# Patient Record
Sex: MALE | Race: WHITE | Employment: FULL TIME | URBAN - NONMETROPOLITAN AREA
[De-identification: names, ages, dates, MRNs, and addresses within clinical notes are randomized per-mention and may not be internally consistent; named-entity substitution may affect disease eponyms.]

---

## 2017-09-06 ENCOUNTER — HOSPITAL ENCOUNTER (EMERGENCY)
Facility: HOSPITAL | Age: 32
Discharge: HOME | End: 2017-09-06
Attending: NURSE PRACTITIONER | Admitting: NURSE PRACTITIONER
Payer: COMMERCIAL

## 2017-09-06 VITALS
SYSTOLIC BLOOD PRESSURE: 141 MMHG | TEMPERATURE: 98 F | BODY MASS INDEX: 23.01 KG/M2 | RESPIRATION RATE: 16 BRPM | OXYGEN SATURATION: 100 % | DIASTOLIC BLOOD PRESSURE: 79 MMHG | HEART RATE: 74 BPM | WEIGHT: 165 LBS

## 2017-09-06 DIAGNOSIS — K04.7 DENTAL ABSCESS: ICD-10-CM

## 2017-09-06 PROCEDURE — 10060 I&D ABSCESS SIMPLE/SINGLE: CPT | Performed by: NURSE PRACTITIONER

## 2017-09-06 PROCEDURE — 10060 I&D ABSCESS SIMPLE/SINGLE: CPT

## 2017-09-06 PROCEDURE — 40000268 ZZH STATISTIC NO CHARGES

## 2017-09-06 RX ORDER — PENICILLIN V POTASSIUM 500 MG/1
500 TABLET, FILM COATED ORAL 3 TIMES DAILY
Qty: 30 TABLET | Refills: 0 | Status: SHIPPED | OUTPATIENT
Start: 2017-09-06 | End: 2017-09-16

## 2017-09-06 ASSESSMENT — ENCOUNTER SYMPTOMS
CHILLS: 0
FEVER: 0
FATIGUE: 0
APPETITE CHANGE: 0

## 2017-09-06 NOTE — ED AVS SNAPSHOT
[facility]    [address] [address]    Phone:  [phone]                                       Wu Ochoa   MRN: [id number]    Department:  [facility]   Date of Visit:  9/6/2017           Patient Information     Date Of Birth          1985        Your diagnoses for this visit were:     Dental abscess left lower posterior jaw, incised and drained       You were seen by Kelley Rai NP.      Follow-up Information     Follow up with [facility].    Specialty:  EMERGENCY MEDICINE    Why:  As needed, If symptoms worsen    Contact information:    [address]  [place] [id number] [phone]    Additional information:    From [place]: Take [address]. Turn left at [address]. Turn left at the first stoplight on [address]. At the first stop sign, take a right onto [address]. Take a left into the parking lot and continue through until you reach the North enterance of the building.       From [place]: Take [address]. Take the [address] ramp towards [place]. Turn left onto [address]. Take a slight right onto [address]. Turn left at the first stoplight on [address]. At the first stop sign, take a right onto [address]. Take a left into the parking lot and continue through until you reach the North enterance of the building.       From [place]: Take [address]. Take a right at [address]. At the first stop sign, take a right onto [address]. Take a left into the parking lot and continue through until you reach the North enterance of the building.         Follow up with Dentist. Call on 9/7/2017.    Why:  to schedule an appointment        Discharge Instructions         Dental Abscess  An abscess is a sac of pus. A dental abscess forms when a tooth or the tissue around it becomes infected with bacteria. The bacteria can enter through  a cavity or a crack in a tooth. It can also infect the gum tissue or bone around a tooth. An untreated abscess can cause the loss of the tooth. It can even spread to other parts of the body and become life-threatening.    Symptoms of a dental abscess   Signs of a dental abscess include:    Toothache, often severe    Tooth pain with hot, cold, or pressure    Pain in the gums, cheek, or jaw    Bad breath or bitter taste in the mouth    Trouble swallowing or opening the mouth    Fever    Swollen or enlarged glands in the neck  Diagnosing a dental abscess  An abscess is diagnosed by looking at your teeth and gums. You will be told if any tests, like dental X-rays, are needed.  Treating a dental abscess  Treatments for a dental abscess may include the following:    Antibiotic medicines to treat the underlying infection.    Pain relievers to help you feel more comfortable. Your health care provider may prescribe a medicine for you. Or, use over-the-counter pain relievers, like acetaminophen or ibuprofen.    Warm saltwater rinses to soothe discomfort and help clear away pus.    Root canal surgery if needed to save the tooth. With a root canal, the infected part of the tooth is removed. A special substance is then used to fill the empty space in the tooth.    Drainage of the abscess if needed. Incisions are made to allow the infected material to drain from the tooth.    Removal of the tooth in cases of severe infection that can t be treated another way.  If the infection is severe, has spread, or doesn t respond to treatment, you may need to be admitted to a hospital.        When to call the dentist  Call your dentist right away if you have any of the following:    Fever of 100.4 F (38 C) or higher    Increased pain, redness, drainage, or swelling in the treated area    Swelling of the face or jawbone    Pain that cannot be controlled with medicines   Preventing dental abscess  To prevent another abscess in the future, keep  "your teeth clean and healthy. Brush twice a day and floss at least once daily. See your dentist for regular tooth cleanings. And avoid sugary foods and drinks that can lead to tooth decay.  Date Last Reviewed: 7/14/2015 2000-2017 [employer]. [address] [place] [address]. All rights reserved. This information is not intended as a substitute for professional medical care. Always follow your healthcare professional's instructions.             Review of your medicines      START taking        Dose / Directions Last dose taken    penicillin V potassium 500 MG tablet   Commonly known as:  VEETID   Dose:  500 mg   Quantity:  30 tablet        Take 1 tablet (500 mg) by mouth 3 times daily for 10 days   Refills:  0                Prescriptions were sent or printed at these locations (1 Prescription)                   [facility] [address] [place] [address], [place] - [address] AT [facility] [address]   [address], [place] [address]    Telephone:  [phone]   Fax:  [phone]   Hours:                  E-Prescribed (1 of 1)         penicillin V potassium (VEETID) 500 MG tablet                Orders Needing Specimen Collection     None      Pending Results     No orders found from 9/4/2017 to 9/7/2017.            Pending Culture Results     No orders found from 9/4/2017 to 9/7/2017.            Thank you for choosing [facility]       Thank you for choosing [facility] for your care. Our goal is always to provide you with excellent care. Hearing back from our patients is one way we can continue to improve our services. Please take a few minutes to complete the written survey that you may receive in the mail after you visit with us. Thank you!        [facility] [employer] Information     [employer] lets you send messages to your doctor, view your test results, renew your prescriptions, schedule appointments and more. To sign up, go to [email or web address] . Click on \"Log in\" on the left side of the screen, " "which will take you to the Welcome page. Then click on \"Sign up Now\" on the right side of the page.     You will be asked to enter the access code listed below, as well as some personal information. Please follow the directions to create your username and password.     Your access code is: [email or web address]  Expires: 2017  9:21 PM     Your access code will  in 90 days. If you need help or a new code, please call your [facility] clinic or [phone].        Care EveryWhere ID     This is your Care EveryWhere ID. This could be used by other organizations to access your [facility] medical records  [id number]        Equal Access to Services     CHANDA CAMEJO : Maribel Menchaca, maria teresa winters, emeka will, marcos valdes. So [facility] [phone].    ATENCIÓN: Si habla español, tiene a gutierrez disposición servicios gratuitos de asistencia lingüística. Llame al [phone].    We comply with applicable federal civil rights laws and [place] laws. We do not discriminate on the basis of race, color, national origin, age, disability sex, sexual orientation or gender identity.            After Visit Summary       This is your record. Keep this with you and show to your community pharmacist(s) and doctor(s) at your next visit.                  "

## 2017-09-06 NOTE — ED AVS SNAPSHOT
[facility]    [address] [address]    Phone:  [phone]                                       Wu Ochoa   MRN: [id number]    Department:  [facility]   Date of Visit:  9/6/2017           After Visit Summary Signature Page     I have received my discharge instructions, and my questions have been answered. I have discussed any challenges I see with this plan with the nurse or doctor.    ..........................................................................................................................................  Patient/Patient Representative Signature      ..........................................................................................................................................  Patient Representative Print Name and Relationship to Patient    ..................................................               ................................................  Date                                            Time    ..........................................................................................................................................  Reviewed by Signature/Title    ...................................................              ..............................................  Date                                                            Time

## 2017-09-07 NOTE — ED NOTES
Pt presents today with his wife for c/o dental pain for 2 days now and feels he has an abscess, affected area is the left bottom molar.

## 2017-09-07 NOTE — ED PROVIDER NOTES
"  History     Chief Complaint   Patient presents with     Dental Pain     \"left bottom tooth causing left jaw pain for 2 days\"      The history is provided by the patient. No  was used.     Wu Ochoa is a 32 year old male who presents today with a CC of left lower dental pain x 2 days.  No fevers or chills.  He notes there is a lump on his left lower gumline.  He sees dr Seaman in [place] for dental services.  Last visit was last year.  He has not taken anything for pain.  Pain level 1-2/10.      I have reviewed the Medications, Allergies, Past Medical and Surgical History, and Social History in the Epic system.    Allergies: No Known Allergies      No current facility-administered medications on file prior to encounter.   No current outpatient prescriptions on file prior to encounter.    Patient Active Problem List   Diagnosis     ACP (advance care planning)       History reviewed. No pertinent surgical history.    Social History   Substance Use Topics     Smoking status: Current Every Day Smoker     Packs/day: 1.00     Years: 15.00     Types: Cigarettes     Start date: 12/5/1999     Smokeless tobacco: Not on file     Alcohol use Yes         There is no immunization history on file for this patient.    BMI: Estimated body mass index is 23.01 kg/(m^2) as calculated from the following:    Height as of 12/5/16: 1.803 m (5' 11\").    Weight as of this encounter: 74.8 kg (165 lb).      Review of Systems   Constitutional: Negative for appetite change, chills, fatigue and fever.   HENT: Positive for dental problem.        Physical Exam   BP: 141/79  Pulse: 74  Temp: 98  F (36.7  C)  Resp: 16  Weight: 74.8 kg (165 lb)  SpO2: 100 %    Physical Exam   Constitutional: He appears well-developed and well-nourished. He is cooperative. He does not appear ill. No distress.   HENT:   Head: Normocephalic and atraumatic.   Mouth/Throat: Uvula is midline. No trismus in the jaw. Dental abscesses (left lower " "posterior alveolar process) present. No uvula swelling.   Neurological: He is alert.   Nursing note and vitals reviewed.      ED Course     ED Course     Incision + drainage  Date/Time: 9/8/2017 8:36 PM  Performed by: OVIDIO LYNCH  Authorized by: OVIDIO LYNCH   Consent: Verbal consent obtained. Written consent obtained.  Risks and benefits: risks, benefits and alternatives were discussed  Consent given by: patient  Patient understanding: patient states understanding of the procedure being performed  Patient consent: the patient's understanding of the procedure matches consent given  Procedure consent: procedure consent matches procedure scheduled  Required items: required blood products, implants, devices, and special equipment available  Patient identity confirmed: verbally with patient and arm band  Time out: Immediately prior to procedure a \"time out\" was called to verify the correct patient, procedure, equipment, support staff and site/side marked as required.  Type: abscess  Body area: mouth  Location details: alveolar process  Anesthesia: local infiltration    Anesthesia:  Local Anesthetic: bupivacaine 0.5% with epinephrine  Anesthetic total: 3 mL    Sedation:  Patient sedated: no  Scalpel size: 11  Incision type: single straight  Incision depth: dermal  Complexity: simple  Drainage: purulent  Drainage amount: moderate  Wound treatment: wound left open  Packing material: none  Patient tolerance: Patient tolerated the procedure well with no immediate complications        Assessments & Plan (with Medical Decision Making)     I have reviewed the nursing notes.    I have reviewed the findings, diagnosis, plan and need for follow up with the patient.  ASSESSMENT / PLAN:  (K04.7) Dental abscess  Comment: left lower posterior jaw, incised and drained  Plan:  Pen VK as prescribed for infection.   Apply a cold pack or ice compress for up to 20 minutes several times a day. This is to help reduce pain and " "relieve swelling. Cover it with a thin, dry cloth before putting it on your skin.     Rinse your mouth with warm saltwater several times per day. This will help reduce irritation, gum swelling, and pain.  Good oral hygiene is imperitive. Brush your at least twice a day. Use a fluoride toothpaste and soft-bristle toothbrush.   Eat a healthy diet that doesn t include many sugary foods and drinks.   Call ASAP to schedule an appointment to see a dentist.   Our  department can try to assist you if you are having difficulty finding a dentist, dental coverage, or paying for dental care.  You can reach them by calling [phone] and asking for .   Return to ED/UC if symptoms increase or concerns develop such as those discussed and listed on the \"When to go the Emergency Room\" portion of your discharge instructions.       Discharge Medication List as of 9/6/2017  9:21 PM      START taking these medications    Details   penicillin V potassium (VEETID) 500 MG tablet Take 1 tablet (500 mg) by mouth 3 times daily for 10 days, Disp-30 tablet, R-0, E-Prescribe             Final diagnoses:   Dental abscess - left lower posterior jaw, incised and drained       9/6/2017   [facility]     Kelley Rai NP  09/08/17 2040    "

## 2017-09-07 NOTE — DISCHARGE INSTRUCTIONS
Dental Abscess  An abscess is a sac of pus. A dental abscess forms when a tooth or the tissue around it becomes infected with bacteria. The bacteria can enter through a cavity or a crack in a tooth. It can also infect the gum tissue or bone around a tooth. An untreated abscess can cause the loss of the tooth. It can even spread to other parts of the body and become life-threatening.    Symptoms of a dental abscess   Signs of a dental abscess include:    Toothache, often severe    Tooth pain with hot, cold, or pressure    Pain in the gums, cheek, or jaw    Bad breath or bitter taste in the mouth    Trouble swallowing or opening the mouth    Fever    Swollen or enlarged glands in the neck  Diagnosing a dental abscess  An abscess is diagnosed by looking at your teeth and gums. You will be told if any tests, like dental X-rays, are needed.  Treating a dental abscess  Treatments for a dental abscess may include the following:    Antibiotic medicines to treat the underlying infection.    Pain relievers to help you feel more comfortable. Your health care provider may prescribe a medicine for you. Or, use over-the-counter pain relievers, like acetaminophen or ibuprofen.    Warm saltwater rinses to soothe discomfort and help clear away pus.    Root canal surgery if needed to save the tooth. With a root canal, the infected part of the tooth is removed. A special substance is then used to fill the empty space in the tooth.    Drainage of the abscess if needed. Incisions are made to allow the infected material to drain from the tooth.    Removal of the tooth in cases of severe infection that can t be treated another way.  If the infection is severe, has spread, or doesn t respond to treatment, you may need to be admitted to a hospital.        When to call the dentist  Call your dentist right away if you have any of the following:    Fever of 100.4 F (38 C) or higher    Increased pain, redness, drainage, or swelling in the  treated area    Swelling of the face or jawbone    Pain that cannot be controlled with medicines   Preventing dental abscess  To prevent another abscess in the future, keep your teeth clean and healthy. Brush twice a day and floss at least once daily. See your dentist for regular tooth cleanings. And avoid sugary foods and drinks that can lead to tooth decay.  Date Last Reviewed: 7/14/2015 2000-2017 The [employer] (NIH). [address] [place] [address], [place] [address]. All rights reserved. This information is not intended as a substitute for professional medical care. Always follow your healthcare professional's instructions.

## 2018-12-18 ENCOUNTER — HOSPITAL ENCOUNTER (EMERGENCY)
Facility: HOSPITAL | Age: 33
Discharge: HOME | End: 2018-12-18
Attending: PHYSICIAN ASSISTANT | Admitting: PHYSICIAN ASSISTANT
Payer: COMMERCIAL

## 2018-12-18 VITALS
RESPIRATION RATE: 17 BRPM | SYSTOLIC BLOOD PRESSURE: 137 MMHG | HEART RATE: 79 BPM | DIASTOLIC BLOOD PRESSURE: 78 MMHG | TEMPERATURE: 98.7 F | OXYGEN SATURATION: 98 %

## 2018-12-18 DIAGNOSIS — H61.22 IMPACTED CERUMEN OF LEFT EAR: ICD-10-CM

## 2018-12-18 DIAGNOSIS — H60.92 OTITIS EXTERNA, LEFT: ICD-10-CM

## 2018-12-18 PROCEDURE — G0463 HOSPITAL OUTPT CLINIC VISIT: HCPCS

## 2018-12-18 PROCEDURE — 99213 OFFICE O/P EST LOW 20 MIN: CPT | Performed by: PHYSICIAN ASSISTANT

## 2018-12-18 RX ORDER — CIPROFLOXACIN 0.5 MG/.25ML
0.25 SOLUTION/ DROPS AURICULAR (OTIC) 2 TIMES DAILY
Qty: 3.5 ML | Refills: 0 | Status: SHIPPED | OUTPATIENT
Start: 2018-12-18 | End: 2018-12-25

## 2018-12-18 ASSESSMENT — ENCOUNTER SYMPTOMS
PSYCHIATRIC NEGATIVE: 1
EYE REDNESS: 0
NECK PAIN: 0
VOMITING: 0
ABDOMINAL PAIN: 0
COUGH: 0
DIZZINESS: 0
NAUSEA: 0
TROUBLE SWALLOWING: 0
FEVER: 0
CARDIOVASCULAR NEGATIVE: 1
HEADACHES: 0
EYE DISCHARGE: 0
LIGHT-HEADEDNESS: 0
VOICE CHANGE: 0
DIARRHEA: 0
SINUS PRESSURE: 0
NECK STIFFNESS: 0
APPETITE CHANGE: 0
SORE THROAT: 0
FATIGUE: 0

## 2018-12-18 NOTE — ED AVS SNAPSHOT
[facility]  [address] [address]  Phone:  [phone]                                    Wu Ochoa   MRN: [id number]    Department[facility]  [facility]   Date of Visit:  12/18/2018           After Visit Summary Signature Page    I have received my discharge instructions, and my questions have been answered. I have discussed any challenges I see with this plan with the nurse or doctor.    ..........................................................................................................................................  Patient/Patient Representative Signature      ..........................................................................................................................................  Patient Representative Print Name and Relationship to Patient    ..................................................               ................................................  Date                                   Time    ..........................................................................................................................................  Reviewed by Signature/Title    ...................................................              ..............................................  Date                                               Time          22EPIC Rev 08/18

## 2018-12-18 NOTE — ED PROVIDER NOTES
History     Chief Complaint   Patient presents with     Ear Fullness     states can't hear out of left ear. Reports it started this morning. States does hurt a little and rates 1/10     The history is provided by the patient. No  was used.     Wu Ochoa is a 33 year old male who has one day of left ear hearing loss. Denies ear pain. Denies any trauma to the ear. No n/v/d/f/c. No cough/congestion. No sinus pain/pressure    Problem List:    Patient Active Problem List    Diagnosis Date Noted     ACP (advance care planning) 12/05/2016     Priority: Medium     Advance Care Planning 12/5/2016: ACP Review of Chart / Resources Provided:  Reviewed chart for advance care plan.  Wu Ochoa has no plan or code status on file. Discussed available resources and provided with information. Confirmed code status reflects current choices pending further ACP discussions.  Confirmed/documented legally designated decision makers.  Added by Azul Fan                  PMH: not pertinent  PSH: not pertinent  FHX: not pertinent    Social History:  Marital Status:   [2]  Social History     Tobacco Use     Smoking status: Current Every Day Smoker     Packs/day: 1.00     Years: 15.00     Pack years: 15.00     Types: Cigarettes     Start date: 12/5/1999   Substance Use Topics     Alcohol use: Yes     Drug use: No        Medications:      ciprofloxacin (CETRAXAL) 0.2 % otic solution         Review of Systems   Constitutional: Negative for appetite change, fatigue and fever.   HENT: Positive for hearing loss. Negative for congestion, ear pain, sinus pressure, sore throat, trouble swallowing and voice change.    Eyes: Negative for discharge and redness.   Respiratory: Negative for cough.    Cardiovascular: Negative.    Gastrointestinal: Negative for abdominal pain, diarrhea, nausea and vomiting.   Genitourinary: Negative.    Musculoskeletal: Negative for neck pain and neck stiffness.   Skin:  Negative for rash.   Neurological: Negative for dizziness, light-headedness and headaches.   Psychiatric/Behavioral: Negative.        Physical Exam   BP: 137/78  Pulse: 79  Temp: 98.7  F (37.1  C)  Resp: 17  SpO2: 98 %      Physical Exam   Constitutional: He is oriented to person, place, and time. He appears well-developed and well-nourished. No distress.   HENT:   Head: Normocephalic and atraumatic.   Right Ear: External ear normal.   Mouth/Throat: Oropharynx is clear and moist.   Bilateral canals clear/wnl  No sinus TTP    Right TM/canal clear/wnl.  Left canal has cerumen impaction  Irrigated and cerumen removed completely. Ear canal has mild erythema, minimal edema. TM intact and gray.   Eyes: Conjunctivae and EOM are normal. Right eye exhibits no discharge. Left eye exhibits no discharge.   Neck: Normal range of motion. Neck supple.   Cardiovascular: Normal rate, regular rhythm and normal heart sounds.   Pulmonary/Chest: Effort normal and breath sounds normal. No respiratory distress.   Abdominal: Soft. Bowel sounds are normal. He exhibits no distension. There is no tenderness.   Neurological: He is alert and oriented to person, place, and time.   Skin: Skin is warm and dry. No rash noted. He is not diaphoretic.   Psychiatric: He has a normal mood and affect.   Nursing note and vitals reviewed.      ED Course        Procedures            No results found for this or any previous visit (from the past 24 hour(s)).    Medications - No data to display    Assessments & Plan (with Medical Decision Making)     I have reviewed the nursing notes.    I have reviewed the findings, diagnosis, plan and need for follow up with the patient.         Medication List      Started    ciprofloxacin 0.2 % otic solution  Commonly known as:  CETRAXAL  0.25 mLs, Left Ear, 2 TIMES DAILY            Final diagnoses:   Impacted cerumen of left ear - resolved   Otitis externa, left           Patient verbally educated and given appropriate  education sheets for the diagnoses and has no questions.  Use medications as directed.   Follow up with your Primary Care provider if symptoms increase or if further concerns develop, return to the ER  Brianna Contreras Certified  Physician Assistant  12/18/2018  5:06 PM  URGENT CARE CLINIC      12/18/2018   [facility]     Brianna Contreras PA  12/18/18 2679

## 2021-01-18 ENCOUNTER — HOSPITAL ENCOUNTER (EMERGENCY)
Facility: HOSPITAL | Age: 36
Discharge: HOME | End: 2021-01-18
Attending: NURSE PRACTITIONER | Admitting: NURSE PRACTITIONER
Payer: COMMERCIAL

## 2021-01-18 VITALS
RESPIRATION RATE: 16 BRPM | TEMPERATURE: 98.9 F | SYSTOLIC BLOOD PRESSURE: 145 MMHG | DIASTOLIC BLOOD PRESSURE: 101 MMHG | OXYGEN SATURATION: 99 % | HEART RATE: 88 BPM

## 2021-01-18 DIAGNOSIS — M54.41 ACUTE LEFT-SIDED LOW BACK PAIN WITH BILATERAL SCIATICA: Primary | ICD-10-CM

## 2021-01-18 DIAGNOSIS — M54.42 ACUTE LEFT-SIDED LOW BACK PAIN WITH BILATERAL SCIATICA: Primary | ICD-10-CM

## 2021-01-18 PROCEDURE — 96372 THER/PROPH/DIAG INJ SC/IM: CPT | Performed by: NURSE PRACTITIONER

## 2021-01-18 PROCEDURE — 250N000013 HC RX MED GY IP 250 OP 250 PS 637: Performed by: NURSE PRACTITIONER

## 2021-01-18 PROCEDURE — 250N000011 HC RX IP 250 OP 636: Performed by: NURSE PRACTITIONER

## 2021-01-18 PROCEDURE — G0463 HOSPITAL OUTPT CLINIC VISIT: HCPCS | Mod: 25

## 2021-01-18 PROCEDURE — 99214 OFFICE O/P EST MOD 30 MIN: CPT | Performed by: NURSE PRACTITIONER

## 2021-01-18 RX ORDER — OXYCODONE HYDROCHLORIDE 5 MG/1
5 TABLET ORAL EVERY 6 HOURS PRN
Qty: 12 TABLET | Refills: 0 | Status: SHIPPED | OUTPATIENT
Start: 2021-01-18

## 2021-01-18 RX ORDER — KETOROLAC TROMETHAMINE 30 MG/ML
30 INJECTION, SOLUTION INTRAMUSCULAR; INTRAVENOUS ONCE
Status: COMPLETED | OUTPATIENT
Start: 2021-01-18 | End: 2021-01-18

## 2021-01-18 RX ORDER — DIAZEPAM 5 MG
5 TABLET ORAL ONCE
Status: COMPLETED | OUTPATIENT
Start: 2021-01-18 | End: 2021-01-18

## 2021-01-18 RX ORDER — IBUPROFEN 800 MG/1
800 TABLET, FILM COATED ORAL EVERY 8 HOURS PRN
Qty: 60 TABLET | Refills: 0 | Status: SHIPPED | OUTPATIENT
Start: 2021-01-18 | End: 2021-01-26

## 2021-01-18 RX ORDER — DIAZEPAM 5 MG
5 TABLET ORAL EVERY 6 HOURS PRN
Qty: 20 TABLET | Refills: 0 | Status: SHIPPED | OUTPATIENT
Start: 2021-01-18 | End: 2021-01-25

## 2021-01-18 RX ORDER — OXYCODONE HYDROCHLORIDE 5 MG/1
5 TABLET ORAL ONCE
Status: COMPLETED | OUTPATIENT
Start: 2021-01-18 | End: 2021-01-18

## 2021-01-18 RX ADMIN — KETOROLAC TROMETHAMINE 30 MG: 30 INJECTION, SOLUTION INTRAMUSCULAR at 09:43

## 2021-01-18 RX ADMIN — OXYCODONE HYDROCHLORIDE 5 MG: 5 TABLET ORAL at 09:43

## 2021-01-18 RX ADMIN — DIAZEPAM 5 MG: 5 TABLET ORAL at 09:43

## 2021-01-18 ASSESSMENT — ENCOUNTER SYMPTOMS
FEVER: 0
CHILLS: 0
BACK PAIN: 1
WOUND: 0
DIFFICULTY URINATING: 0
ROS GI COMMENTS: NO LOSS OF BOWEL CONTROL
NECK PAIN: 0
WEAKNESS: 0
NECK STIFFNESS: 0
NUMBNESS: 0

## 2021-01-18 NOTE — DISCHARGE INSTRUCTIONS
(M54.42,  M54.41) Acute left-sided low back pain with bilateral sciatica  (primary encounter diagnosis)  Very pleasant 35-year old male presents ambulatory to the ED in acute distress with new onset left sided back pain after use of inversion table on Friday evening. He does not have a history of chronic back problems. No midline/vertebral tenderness. No red flag s/s warranting emergent imaging at this time. Plan for symptomatic treatment and follow-up with PCP.  Recommend:  - Non-pharmacologic management with:   Ice and/or heat.    Ice area of discomfort: On for 15 minutes, off for 15 minutes. Repeat as necessary for comfort.   Heat area of discomfort: On for 15 minutes, off for 15 minutes. Repeat as necessary for comfort.  Topical anesthetic such as Biofreeze, Bengay, Icy Hot, Lidocaine, others. Gentle stretches.    - Continue with pharmacologic pain management regimen of:     Maximize use of Tylenol and Ibuprofen as below:  (You can alternate Tylenol (acetaminophen) 1,000 mg with Advil (ibuprofen) 800 mg with food.  Example: Ibuprofen 8 AM, Tylenol 12 PM, ibuprofen 4 PM, Tylenol 8 PM, ibuprofen 10 PM, etc.).  Take ibuprofen with food.    - Use diazepam 5 mg every 6 hours for muscle spasms  - If you have tried the above and continue with severe pain: Use oxycodone [narcotic pain medication] as directed.    - You can also try interventions such as meditation, distraction, spinal manipulation by a chiropractor, and acupuncture for pain management.       **DO NOT DRINK ALCOHOL OR DRIVE WHILE TAKING DIAZEPAM OR OXYCODONE**    Narcotics are intended for short term use of acute pain.  They are not typically indicated for long term use or treatment of chronic pain due to risks and side effects.  Avoid operating vehicle/heavy equipment while taking narcotics. Also avoid drinking alcohol and using elicit substances while taking narcotics as doing so may increase your risk of side effects including respiratory depression  which can lead to death.  Common side effects of narcotics include but are not limited to: constipation, nausea, vomiting, dizziness, sedation.           RETURN TO THE ED WITH NEW OR WORSENING SYMPTOMS.    URGENT CARE FOLLOW-UP WITH YOUR PRIMARY CARE PROVIDER IN 5-7 DAYS.      Gayatri Gonzáles, CNP

## 2021-01-18 NOTE — ED PROVIDER NOTES
History     Chief Complaint   Patient presents with     Back Pain     HPI  Wu Ochoa is a 35 year old male who presents ambulatory for evaluation of back pain.    Back Pain       Duration: Friday (3 days ago)        Specific cause: Used friend's inversion table Friday evening - did not have immediate pain after use - He was sleeping and the pain woke him up around midnight.     Description:   Location of pain: Left lower back  Character of pain: constant sharp, stabbing pain.   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 no         Allergies:  No Known Allergies    Problem List:    Patient Active Problem List    Diagnosis Date Noted     ACP (advance care planning) 12/05/2016     Priority: Medium     Advance Care Planning 12/5/2016: ACP Review of Chart / Resources Provided:  Reviewed chart for advance care plan.  Wu Ochoa has no plan or code status on file. Discussed available resources and provided with information. Confirmed code status reflects current choices pending further ACP discussions.  Confirmed/documented legally designated decision makers.  Added by Azul Fan                Past Medical  History:    No past medical history on file.    Past Surgical History:    No past surgical history on file.    Family History:    No family history on file.    Social History:  Marital Status:   [2]  Social History     Tobacco Use     Smoking status: Current Every Day Smoker     Packs/day: 1.00     Years: 15.00     Pack years: 15.00     Types: Cigarettes     Start date: 12/5/1999   Substance Use Topics     Alcohol use: Yes     Drug use: No        Medications:         diazepam (VALIUM) 5 MG tablet       ibuprofen (ADVIL/MOTRIN) 800 MG tablet       oxyCODONE (ROXICODONE) 5 MG tablet          Review of Systems   Constitutional: Negative for chills and fever.   Gastrointestinal:        No loss of bowel control   Genitourinary: Negative for difficulty urinating.        No loss of bladder control  Denies numbness, tingling of perineum   Musculoskeletal: Positive for back pain (left sided) and gait problem (due to back pain). Negative for neck pain and neck stiffness.   Skin: Negative for rash and wound.   Neurological: Negative for weakness (of bilateral legs) and numbness (of bilateral legs).       Physical Exam   BP: (!) 165/114  Pulse: 88  Temp: 98.9  F (37.2  C)  Resp: 16  SpO2: 99 %      Physical Exam  Constitutional:       General: He is in acute distress.      Appearance: Normal appearance.      Comments: Shaking, tearful due to back pain   HENT:      Head: Normocephalic.   Neck:      Musculoskeletal: Full passive range of motion without pain. No spinous process tenderness or muscular tenderness.   Cardiovascular:      Rate and Rhythm: Normal rate and regular rhythm.      Heart sounds: S1 normal and S2 normal. No murmur. No friction rub. No gallop.    Pulmonary:      Effort: Pulmonary effort is normal.      Breath sounds: Normal breath sounds. No wheezing, rhonchi or rales.   Musculoskeletal:      Cervical back: He exhibits normal range of motion, no tenderness and no bony tenderness.      Thoracic back: He  exhibits no bony tenderness.      Lumbar back: He exhibits no bony tenderness.        Back:       Comments: FROM of upper and lower extremities   Skin:     General: Skin is warm and dry.      Capillary Refill: Capillary refill takes less than 2 seconds.      Coloration: Skin is not pale.   Neurological:      Mental Status: He is alert and oriented to person, place, and time.      Sensory: No sensory deficit (of lower extremities).      Gait: Gait abnormal (antalgic).      Deep Tendon Reflexes:      Reflex Scores:       Patellar reflexes are 2+ on the right side and 2+ on the left side.  Psychiatric:         Speech: Speech normal.         Behavior: Behavior normal. Behavior is cooperative.         ED Course        Procedures    No results found for this or any previous visit (from the past 24 hour(s)).    Medications   diazepam (VALIUM) tablet 5 mg (5 mg Oral Given 1/18/21 0943)   oxyCODONE (ROXICODONE) tablet 5 mg (5 mg Oral Given 1/18/21 0943)   ketorolac (TORADOL) injection 30 mg (30 mg Intramuscular Given 1/18/21 0943)       Assessments & Plan (with Medical Decision Making)     I have reviewed the nursing notes.    I have reviewed the findings, diagnosis, plan and need for follow up with the patient.  (M54.42,  M54.41) Acute left-sided low back pain with bilateral sciatica  (primary encounter diagnosis)  Very pleasant 35-year old male presents ambulatory to the ED in acute distress with new onset left sided back pain after use of inversion table on Friday evening. He does not have a history of chronic back problems. No midline/vertebral tenderness. No red flag s/s warranting emergent imaging at this time. Plan for symptomatic treatment and follow-up with PCP.  Recommend:  - Non-pharmacologic management with:   Ice and/or heat.    Ice area of discomfort: On for 15 minutes, off for 15 minutes. Repeat as necessary for comfort.   Heat area of discomfort: On for 15 minutes, off for 15 minutes. Repeat as necessary for  comfort.  Topical anesthetic such as Biofreeze, Bengay, Icy Hot, Lidocaine, others. Gentle stretches.    - Continue with pharmacologic pain management regimen of:     Maximize use of Tylenol and Ibuprofen as below:  (You can alternate Tylenol (acetaminophen) 1,000 mg with Advil (ibuprofen) 800 mg with food.  Example: Ibuprofen 8 AM, Tylenol 12 PM, ibuprofen 4 PM, Tylenol 8 PM, ibuprofen 10 PM, etc.).  Take ibuprofen with food.    - Use diazepam 5 mg every 6 hours for muscle spasms  - If you have tried the above and continue with severe pain: Use oxycodone [narcotic pain medication] as directed.    - You can also try interventions such as meditation, distraction, spinal manipulation by a chiropractor, and acupuncture for pain management.       RETURN TO THE ED WITH NEW OR WORSENING SYMPTOMS.    URGENT CARE FOLLOW-UP WITH YOUR PRIMARY CARE PROVIDER IN 5-7 DAYS.      Gayatri Gonzáles CNP        New Prescriptions    DIAZEPAM (VALIUM) 5 MG TABLET    Take 1 tablet (5 mg) by mouth every 6 hours as needed for anxiety (MUSCLE SPASM)    IBUPROFEN (ADVIL/MOTRIN) 800 MG TABLET    Take 1 tablet (800 mg) by mouth every 8 hours as needed for moderate pain    OXYCODONE (ROXICODONE) 5 MG TABLET    Take 1 tablet (5 mg) by mouth every 6 hours as needed for pain       Final diagnoses:   Acute left-sided low back pain with bilateral sciatica       1/18/2021   [facility]     Gayatri Gonzáles CNP  01/18/21 0952